# Patient Record
Sex: FEMALE | Race: WHITE | NOT HISPANIC OR LATINO | Employment: UNEMPLOYED | URBAN - METROPOLITAN AREA
[De-identification: names, ages, dates, MRNs, and addresses within clinical notes are randomized per-mention and may not be internally consistent; named-entity substitution may affect disease eponyms.]

---

## 2024-08-29 ENCOUNTER — APPOINTMENT (OUTPATIENT)
Dept: ADMISSIONS | Facility: MEDICAL CENTER | Age: 64
End: 2024-08-29
Attending: ORTHOPAEDIC SURGERY
Payer: MEDICAID

## 2024-08-29 NOTE — DISCHARGE PLANNING
DISCHARGE PLANNING NOTE - TOTAL JOINT    Procedure: Procedure(s):  SURGICAL FIXATION OF RIGHT TRIMALLEOLAR ANKLE FRACTURE, OTHER INDICATED PROCEDURES  Procedure Date: 8/30/2024  Insurance: Payor: [place] MEDICA[place] / Plan: [place] / Product Type: *No Product type* /    Equipment currently available at home?  shower chair and wheelchair  Steps into the home? 0  Steps within the home? Multi level home   Toilet height? Standard  Type of shower? walk-in shower  Who will be with you during your recovery? other: SO    Plan:   RN JAIME spoke with pt over the phone during scheduled CM appointment. Home safety check list and medical equipment list reviewed and emailed. Pt states she was told she was going to be NWB for 6-10 weeks. Recommended crutches, knee scooter and toilet seat riser. Discharge criteria discussed with patient. All questions and concerns addressed. Anticipate discharge home with no barriers.

## 2024-08-30 ENCOUNTER — HOSPITAL ENCOUNTER (OUTPATIENT)
Facility: MEDICAL CENTER | Age: 64
End: 2024-08-31
Attending: ORTHOPAEDIC SURGERY | Admitting: STUDENT IN AN ORGANIZED HEALTH CARE EDUCATION/TRAINING PROGRAM
Payer: MEDICAID

## 2024-08-30 ENCOUNTER — ANESTHESIA (OUTPATIENT)
Dept: SURGERY | Facility: MEDICAL CENTER | Age: 64
End: 2024-08-30
Payer: MEDICAID

## 2024-08-30 ENCOUNTER — APPOINTMENT (OUTPATIENT)
Dept: RADIOLOGY | Facility: MEDICAL CENTER | Age: 64
End: 2024-08-30
Attending: ORTHOPAEDIC SURGERY
Payer: MEDICAID

## 2024-08-30 ENCOUNTER — ANESTHESIA EVENT (OUTPATIENT)
Dept: SURGERY | Facility: MEDICAL CENTER | Age: 64
End: 2024-08-30
Payer: MEDICAID

## 2024-08-30 DIAGNOSIS — F41.9 ANXIETY: ICD-10-CM

## 2024-08-30 DIAGNOSIS — S82.891A CLOSED RIGHT ANKLE FRACTURE, INITIAL ENCOUNTER: ICD-10-CM

## 2024-08-30 PROBLEM — G89.29 CHRONIC PAIN: Status: ACTIVE | Noted: 2024-08-30

## 2024-08-30 PROBLEM — I16.1 HYPERTENSIVE EMERGENCY: Status: ACTIVE | Noted: 2024-08-30

## 2024-08-30 PROBLEM — F11.20 CHRONIC NARCOTIC DEPENDENCE (HCC): Status: ACTIVE | Noted: 2024-08-30

## 2024-08-30 LAB
ANION GAP SERPL CALC-SCNC: 14 MMOL/L (ref 7–16)
BUN SERPL-MCNC: 12 MG/DL (ref 8–22)
CALCIUM SERPL-MCNC: 9.5 MG/DL (ref 8.5–10.5)
CHLORIDE SERPL-SCNC: 104 MMOL/L (ref 96–112)
CO2 SERPL-SCNC: 22 MMOL/L (ref 20–33)
CREAT SERPL-MCNC: 0.97 MG/DL (ref 0.5–1.4)
EKG IMPRESSION: NORMAL
ERYTHROCYTE [DISTWIDTH] IN BLOOD BY AUTOMATED COUNT: 51.2 FL (ref 35.9–50)
GFR SERPLBLD CREATININE-BSD FMLA CKD-EPI: 66 ML/MIN/1.73 M 2
GLUCOSE SERPL-MCNC: 114 MG/DL (ref 65–99)
HCT VFR BLD AUTO: 38.9 % (ref 37–47)
HGB BLD-MCNC: 13 G/DL (ref 12–16)
MCH RBC QN AUTO: 34.9 PG (ref 27–33)
MCHC RBC AUTO-ENTMCNC: 33.4 G/DL (ref 32.2–35.5)
MCV RBC AUTO: 104.3 FL (ref 81.4–97.8)
PLATELET # BLD AUTO: 371 K/UL (ref 164–446)
PMV BLD AUTO: 10.6 FL (ref 9–12.9)
POTASSIUM SERPL-SCNC: 5.1 MMOL/L (ref 3.6–5.5)
RBC # BLD AUTO: 3.73 M/UL (ref 4.2–5.4)
SODIUM SERPL-SCNC: 140 MMOL/L (ref 135–145)
WBC # BLD AUTO: 7.5 K/UL (ref 4.8–10.8)

## 2024-08-30 PROCEDURE — 160002 HCHG RECOVERY MINUTES (STAT): Performed by: ORTHOPAEDIC SURGERY

## 2024-08-30 PROCEDURE — 700111 HCHG RX REV CODE 636 W/ 250 OVERRIDE (IP): Performed by: ANESTHESIOLOGY

## 2024-08-30 PROCEDURE — A9270 NON-COVERED ITEM OR SERVICE: HCPCS | Performed by: ANESTHESIOLOGY

## 2024-08-30 PROCEDURE — G0378 HOSPITAL OBSERVATION PER HR: HCPCS

## 2024-08-30 PROCEDURE — 93005 ELECTROCARDIOGRAM TRACING: CPT | Performed by: ORTHOPAEDIC SURGERY

## 2024-08-30 PROCEDURE — 96375 TX/PRO/DX INJ NEW DRUG ADDON: CPT

## 2024-08-30 PROCEDURE — C1713 ANCHOR/SCREW BN/BN,TIS/BN: HCPCS | Performed by: ORTHOPAEDIC SURGERY

## 2024-08-30 PROCEDURE — 99223 1ST HOSP IP/OBS HIGH 75: CPT | Performed by: STUDENT IN AN ORGANIZED HEALTH CARE EDUCATION/TRAINING PROGRAM

## 2024-08-30 PROCEDURE — 700105 HCHG RX REV CODE 258: Performed by: ANESTHESIOLOGY

## 2024-08-30 PROCEDURE — 700102 HCHG RX REV CODE 250 W/ 637 OVERRIDE(OP)

## 2024-08-30 PROCEDURE — 96374 THER/PROPH/DIAG INJ IV PUSH: CPT

## 2024-08-30 PROCEDURE — 700102 HCHG RX REV CODE 250 W/ 637 OVERRIDE(OP): Performed by: ANESTHESIOLOGY

## 2024-08-30 PROCEDURE — 160036 HCHG PACU - EA ADDL 30 MINS PHASE I: Performed by: ORTHOPAEDIC SURGERY

## 2024-08-30 PROCEDURE — 80048 BASIC METABOLIC PNL TOTAL CA: CPT

## 2024-08-30 PROCEDURE — 160009 HCHG ANES TIME/MIN: Performed by: ORTHOPAEDIC SURGERY

## 2024-08-30 PROCEDURE — 160048 HCHG OR STATISTICAL LEVEL 1-5: Performed by: ORTHOPAEDIC SURGERY

## 2024-08-30 PROCEDURE — 160029 HCHG SURGERY MINUTES - 1ST 30 MINS LEVEL 4: Performed by: ORTHOPAEDIC SURGERY

## 2024-08-30 PROCEDURE — 700111 HCHG RX REV CODE 636 W/ 250 OVERRIDE (IP): Mod: JZ | Performed by: ANESTHESIOLOGY

## 2024-08-30 PROCEDURE — 93010 ELECTROCARDIOGRAM REPORT: CPT | Performed by: INTERNAL MEDICINE

## 2024-08-30 PROCEDURE — 700102 HCHG RX REV CODE 250 W/ 637 OVERRIDE(OP): Performed by: STUDENT IN AN ORGANIZED HEALTH CARE EDUCATION/TRAINING PROGRAM

## 2024-08-30 PROCEDURE — 73610 X-RAY EXAM OF ANKLE: CPT | Mod: RT

## 2024-08-30 PROCEDURE — A9270 NON-COVERED ITEM OR SERVICE: HCPCS

## 2024-08-30 PROCEDURE — A9270 NON-COVERED ITEM OR SERVICE: HCPCS | Performed by: STUDENT IN AN ORGANIZED HEALTH CARE EDUCATION/TRAINING PROGRAM

## 2024-08-30 PROCEDURE — 64445 NJX AA&/STRD SCIATIC NRV IMG: CPT | Performed by: ORTHOPAEDIC SURGERY

## 2024-08-30 PROCEDURE — 64447 NJX AA&/STRD FEMORAL NRV IMG: CPT | Performed by: ORTHOPAEDIC SURGERY

## 2024-08-30 PROCEDURE — 160035 HCHG PACU - 1ST 60 MINS PHASE I: Performed by: ORTHOPAEDIC SURGERY

## 2024-08-30 PROCEDURE — 85027 COMPLETE CBC AUTOMATED: CPT

## 2024-08-30 PROCEDURE — 700101 HCHG RX REV CODE 250: Performed by: ANESTHESIOLOGY

## 2024-08-30 PROCEDURE — 700105 HCHG RX REV CODE 258: Performed by: ORTHOPAEDIC SURGERY

## 2024-08-30 PROCEDURE — 160041 HCHG SURGERY MINUTES - EA ADDL 1 MIN LEVEL 4: Performed by: ORTHOPAEDIC SURGERY

## 2024-08-30 PROCEDURE — 700111 HCHG RX REV CODE 636 W/ 250 OVERRIDE (IP): Performed by: STUDENT IN AN ORGANIZED HEALTH CARE EDUCATION/TRAINING PROGRAM

## 2024-08-30 DEVICE — IMPLANTABLE DEVICE: Type: IMPLANTABLE DEVICE | Site: ANKLE | Status: FUNCTIONAL

## 2024-08-30 DEVICE — SCREW BONE VARIAX T10 FULL THREAD L12 MM OD3.5 MM FOOT ANKLE LOCK STARDRIVE NONSTERILE: Type: IMPLANTABLE DEVICE | Site: ANKLE | Status: FUNCTIONAL

## 2024-08-30 DEVICE — SCREW BONE VARIAX T10 FULL THREAD L14 MM OD3.5 MM FOOT ANKLE LOCK STARDRIVE NONSTERILE: Type: IMPLANTABLE DEVICE | Site: ANKLE | Status: FUNCTIONAL

## 2024-08-30 RX ORDER — LIDOCAINE HYDROCHLORIDE 20 MG/ML
INJECTION, SOLUTION EPIDURAL; INFILTRATION; INTRACAUDAL; PERINEURAL PRN
Status: DISCONTINUED | OUTPATIENT
Start: 2024-08-30 | End: 2024-08-30 | Stop reason: SURG

## 2024-08-30 RX ORDER — BUPIVACAINE HYDROCHLORIDE 5 MG/ML
INJECTION, SOLUTION EPIDURAL; INTRACAUDAL
Status: COMPLETED | OUTPATIENT
Start: 2024-08-30 | End: 2024-08-30

## 2024-08-30 RX ORDER — AMOXICILLIN 250 MG
2 CAPSULE ORAL EVERY EVENING
Status: DISCONTINUED | OUTPATIENT
Start: 2024-08-30 | End: 2024-08-31 | Stop reason: HOSPADM

## 2024-08-30 RX ORDER — OXYCODONE HYDROCHLORIDE 10 MG/1
10 TABLET ORAL
Status: DISCONTINUED | OUTPATIENT
Start: 2024-08-30 | End: 2024-08-31 | Stop reason: HOSPADM

## 2024-08-30 RX ORDER — METOCLOPRAMIDE HYDROCHLORIDE 5 MG/ML
10 INJECTION INTRAMUSCULAR; INTRAVENOUS EVERY 6 HOURS
Status: DISCONTINUED | OUTPATIENT
Start: 2024-08-30 | End: 2024-08-31 | Stop reason: HOSPADM

## 2024-08-30 RX ORDER — MULTIVITAMIN WITH IRON
250 TABLET ORAL DAILY
Status: DISCONTINUED | OUTPATIENT
Start: 2024-08-31 | End: 2024-08-30

## 2024-08-30 RX ORDER — SCOLOPAMINE TRANSDERMAL SYSTEM 1 MG/1
PATCH, EXTENDED RELEASE TRANSDERMAL
Status: COMPLETED
Start: 2024-08-30 | End: 2024-08-30

## 2024-08-30 RX ORDER — SCOLOPAMINE TRANSDERMAL SYSTEM 1 MG/1
PATCH, EXTENDED RELEASE TRANSDERMAL PRN
Status: DISCONTINUED | OUTPATIENT
Start: 2024-08-30 | End: 2024-08-30 | Stop reason: SURG

## 2024-08-30 RX ORDER — HYDRALAZINE HYDROCHLORIDE 20 MG/ML
10 INJECTION INTRAMUSCULAR; INTRAVENOUS EVERY 4 HOURS PRN
Status: DISCONTINUED | OUTPATIENT
Start: 2024-08-30 | End: 2024-08-31 | Stop reason: HOSPADM

## 2024-08-30 RX ORDER — METOCLOPRAMIDE HYDROCHLORIDE 5 MG/ML
INJECTION INTRAMUSCULAR; INTRAVENOUS PRN
Status: DISCONTINUED | OUTPATIENT
Start: 2024-08-30 | End: 2024-08-30 | Stop reason: SURG

## 2024-08-30 RX ORDER — HYDROMORPHONE HYDROCHLORIDE 1 MG/ML
0.5 INJECTION, SOLUTION INTRAMUSCULAR; INTRAVENOUS; SUBCUTANEOUS
Status: DISCONTINUED | OUTPATIENT
Start: 2024-08-30 | End: 2024-08-31 | Stop reason: HOSPADM

## 2024-08-30 RX ORDER — SODIUM CHLORIDE, SODIUM LACTATE, POTASSIUM CHLORIDE, CALCIUM CHLORIDE 600; 310; 30; 20 MG/100ML; MG/100ML; MG/100ML; MG/100ML
INJECTION, SOLUTION INTRAVENOUS CONTINUOUS
Status: DISCONTINUED | OUTPATIENT
Start: 2024-08-30 | End: 2024-08-30

## 2024-08-30 RX ORDER — HYDROMORPHONE HYDROCHLORIDE 1 MG/ML
0.2 INJECTION, SOLUTION INTRAMUSCULAR; INTRAVENOUS; SUBCUTANEOUS
Status: DISCONTINUED | OUTPATIENT
Start: 2024-08-30 | End: 2024-08-30 | Stop reason: HOSPADM

## 2024-08-30 RX ORDER — PROMETHAZINE HYDROCHLORIDE 25 MG/1
12.5-25 TABLET ORAL EVERY 4 HOURS PRN
Status: DISCONTINUED | OUTPATIENT
Start: 2024-08-30 | End: 2024-08-31 | Stop reason: HOSPADM

## 2024-08-30 RX ORDER — LABETALOL HYDROCHLORIDE 5 MG/ML
5 INJECTION, SOLUTION INTRAVENOUS
Status: DISCONTINUED | OUTPATIENT
Start: 2024-08-30 | End: 2024-08-30 | Stop reason: HOSPADM

## 2024-08-30 RX ORDER — IPRATROPIUM BROMIDE AND ALBUTEROL SULFATE 2.5; .5 MG/3ML; MG/3ML
3 SOLUTION RESPIRATORY (INHALATION)
Status: DISCONTINUED | OUTPATIENT
Start: 2024-08-30 | End: 2024-08-31 | Stop reason: HOSPADM

## 2024-08-30 RX ORDER — KETOROLAC TROMETHAMINE 15 MG/ML
15 INJECTION, SOLUTION INTRAMUSCULAR; INTRAVENOUS EVERY 6 HOURS
Status: DISCONTINUED | OUTPATIENT
Start: 2024-08-30 | End: 2024-08-31 | Stop reason: HOSPADM

## 2024-08-30 RX ORDER — IBUPROFEN 800 MG/1
800 TABLET, FILM COATED ORAL 3 TIMES DAILY PRN
Status: DISCONTINUED | OUTPATIENT
Start: 2024-09-02 | End: 2024-08-31 | Stop reason: HOSPADM

## 2024-08-30 RX ORDER — DIPHENHYDRAMINE HYDROCHLORIDE 50 MG/ML
12.5 INJECTION INTRAMUSCULAR; INTRAVENOUS
Status: DISCONTINUED | OUTPATIENT
Start: 2024-08-30 | End: 2024-08-30 | Stop reason: HOSPADM

## 2024-08-30 RX ORDER — CEFAZOLIN SODIUM 1 G/3ML
INJECTION, POWDER, FOR SOLUTION INTRAMUSCULAR; INTRAVENOUS PRN
Status: DISCONTINUED | OUTPATIENT
Start: 2024-08-30 | End: 2024-08-30 | Stop reason: SURG

## 2024-08-30 RX ORDER — ACETAMINOPHEN 325 MG/1
650 TABLET ORAL EVERY 6 HOURS PRN
Status: DISCONTINUED | OUTPATIENT
Start: 2024-08-30 | End: 2024-08-30

## 2024-08-30 RX ORDER — EPHEDRINE SULFATE 50 MG/ML
5 INJECTION, SOLUTION INTRAVENOUS
Status: DISCONTINUED | OUTPATIENT
Start: 2024-08-30 | End: 2024-08-30 | Stop reason: HOSPADM

## 2024-08-30 RX ORDER — SODIUM CHLORIDE, SODIUM LACTATE, POTASSIUM CHLORIDE, CALCIUM CHLORIDE 600; 310; 30; 20 MG/100ML; MG/100ML; MG/100ML; MG/100ML
INJECTION, SOLUTION INTRAVENOUS CONTINUOUS
Status: DISCONTINUED | OUTPATIENT
Start: 2024-08-30 | End: 2024-08-30 | Stop reason: HOSPADM

## 2024-08-30 RX ORDER — ACETAMINOPHEN 325 MG/1
650 TABLET ORAL EVERY 6 HOURS
Status: DISCONTINUED | OUTPATIENT
Start: 2024-08-30 | End: 2024-08-31 | Stop reason: HOSPADM

## 2024-08-30 RX ORDER — SODIUM CHLORIDE, SODIUM LACTATE, POTASSIUM CHLORIDE, CALCIUM CHLORIDE 600; 310; 30; 20 MG/100ML; MG/100ML; MG/100ML; MG/100ML
INJECTION, SOLUTION INTRAVENOUS CONTINUOUS
Status: ACTIVE | OUTPATIENT
Start: 2024-08-30 | End: 2024-08-31

## 2024-08-30 RX ORDER — PROCHLORPERAZINE EDISYLATE 5 MG/ML
5-10 INJECTION INTRAMUSCULAR; INTRAVENOUS EVERY 4 HOURS PRN
Status: DISCONTINUED | OUTPATIENT
Start: 2024-08-30 | End: 2024-08-31 | Stop reason: HOSPADM

## 2024-08-30 RX ORDER — OXYCODONE HYDROCHLORIDE 5 MG/1
5 TABLET ORAL
Status: DISCONTINUED | OUTPATIENT
Start: 2024-08-30 | End: 2024-08-31 | Stop reason: HOSPADM

## 2024-08-30 RX ORDER — DEXAMETHASONE SODIUM PHOSPHATE 4 MG/ML
INJECTION, SOLUTION INTRA-ARTICULAR; INTRALESIONAL; INTRAMUSCULAR; INTRAVENOUS; SOFT TISSUE
Status: COMPLETED | OUTPATIENT
Start: 2024-08-30 | End: 2024-08-30

## 2024-08-30 RX ORDER — PROMETHAZINE HYDROCHLORIDE 25 MG/1
12.5-25 SUPPOSITORY RECTAL EVERY 4 HOURS PRN
Status: DISCONTINUED | OUTPATIENT
Start: 2024-08-30 | End: 2024-08-31 | Stop reason: HOSPADM

## 2024-08-30 RX ORDER — DEXAMETHASONE SODIUM PHOSPHATE 4 MG/ML
INJECTION, SOLUTION INTRA-ARTICULAR; INTRALESIONAL; INTRAMUSCULAR; INTRAVENOUS; SOFT TISSUE PRN
Status: DISCONTINUED | OUTPATIENT
Start: 2024-08-30 | End: 2024-08-30 | Stop reason: SURG

## 2024-08-30 RX ORDER — ONDANSETRON 2 MG/ML
4 INJECTION INTRAMUSCULAR; INTRAVENOUS EVERY 4 HOURS PRN
Status: DISCONTINUED | OUTPATIENT
Start: 2024-08-30 | End: 2024-08-31 | Stop reason: HOSPADM

## 2024-08-30 RX ORDER — SODIUM CHLORIDE, SODIUM LACTATE, POTASSIUM CHLORIDE, CALCIUM CHLORIDE 600; 310; 30; 20 MG/100ML; MG/100ML; MG/100ML; MG/100ML
INJECTION, SOLUTION INTRAVENOUS
Status: DISCONTINUED | OUTPATIENT
Start: 2024-08-30 | End: 2024-08-30 | Stop reason: SURG

## 2024-08-30 RX ORDER — HYDRALAZINE HYDROCHLORIDE 20 MG/ML
5 INJECTION INTRAMUSCULAR; INTRAVENOUS
Status: DISCONTINUED | OUTPATIENT
Start: 2024-08-30 | End: 2024-08-30 | Stop reason: HOSPADM

## 2024-08-30 RX ORDER — ONDANSETRON 2 MG/ML
INJECTION INTRAMUSCULAR; INTRAVENOUS PRN
Status: DISCONTINUED | OUTPATIENT
Start: 2024-08-30 | End: 2024-08-30 | Stop reason: SURG

## 2024-08-30 RX ORDER — SODIUM CHLORIDE, SODIUM LACTATE, POTASSIUM CHLORIDE, AND CALCIUM CHLORIDE .6; .31; .03; .02 G/100ML; G/100ML; G/100ML; G/100ML
500 INJECTION, SOLUTION INTRAVENOUS
Status: DISCONTINUED | OUTPATIENT
Start: 2024-08-30 | End: 2024-08-31 | Stop reason: HOSPADM

## 2024-08-30 RX ORDER — SODIUM CHLORIDE, SODIUM LACTATE, POTASSIUM CHLORIDE, CALCIUM CHLORIDE 600; 310; 30; 20 MG/100ML; MG/100ML; MG/100ML; MG/100ML
INJECTION, SOLUTION INTRAVENOUS CONTINUOUS
Status: ACTIVE | OUTPATIENT
Start: 2024-08-30 | End: 2024-08-30

## 2024-08-30 RX ORDER — LABETALOL HYDROCHLORIDE 5 MG/ML
10 INJECTION, SOLUTION INTRAVENOUS EVERY 4 HOURS PRN
Status: DISCONTINUED | OUTPATIENT
Start: 2024-08-30 | End: 2024-08-31 | Stop reason: HOSPADM

## 2024-08-30 RX ORDER — POLYETHYLENE GLYCOL 3350 17 G/17G
1 POWDER, FOR SOLUTION ORAL
Status: DISCONTINUED | OUTPATIENT
Start: 2024-08-30 | End: 2024-08-31 | Stop reason: HOSPADM

## 2024-08-30 RX ORDER — MAGNESIUM SULFATE HEPTAHYDRATE 40 MG/ML
INJECTION, SOLUTION INTRAVENOUS PRN
Status: DISCONTINUED | OUTPATIENT
Start: 2024-08-30 | End: 2024-08-30 | Stop reason: SURG

## 2024-08-30 RX ORDER — KETOROLAC TROMETHAMINE 15 MG/ML
INJECTION, SOLUTION INTRAMUSCULAR; INTRAVENOUS PRN
Status: DISCONTINUED | OUTPATIENT
Start: 2024-08-30 | End: 2024-08-30 | Stop reason: SURG

## 2024-08-30 RX ORDER — HYDROMORPHONE HYDROCHLORIDE 1 MG/ML
0.4 INJECTION, SOLUTION INTRAMUSCULAR; INTRAVENOUS; SUBCUTANEOUS
Status: DISCONTINUED | OUTPATIENT
Start: 2024-08-30 | End: 2024-08-30 | Stop reason: HOSPADM

## 2024-08-30 RX ORDER — DIAZEPAM 5 MG/1
5 TABLET ORAL 3 TIMES DAILY
Status: DISCONTINUED | OUTPATIENT
Start: 2024-08-30 | End: 2024-08-31 | Stop reason: HOSPADM

## 2024-08-30 RX ORDER — ACETAMINOPHEN 325 MG/1
650 TABLET ORAL EVERY 6 HOURS PRN
Status: DISCONTINUED | OUTPATIENT
Start: 2024-09-04 | End: 2024-08-31 | Stop reason: HOSPADM

## 2024-08-30 RX ORDER — MIDAZOLAM HYDROCHLORIDE 1 MG/ML
INJECTION INTRAMUSCULAR; INTRAVENOUS PRN
Status: DISCONTINUED | OUTPATIENT
Start: 2024-08-30 | End: 2024-08-30 | Stop reason: SURG

## 2024-08-30 RX ORDER — HYDROMORPHONE HYDROCHLORIDE 1 MG/ML
0.1 INJECTION, SOLUTION INTRAMUSCULAR; INTRAVENOUS; SUBCUTANEOUS
Status: DISCONTINUED | OUTPATIENT
Start: 2024-08-30 | End: 2024-08-30 | Stop reason: HOSPADM

## 2024-08-30 RX ORDER — IBUPROFEN 200 MG
800 TABLET ORAL EVERY 8 HOURS PRN
COMMUNITY

## 2024-08-30 RX ORDER — MIDAZOLAM HYDROCHLORIDE 1 MG/ML
2 INJECTION INTRAMUSCULAR; INTRAVENOUS
Status: DISCONTINUED | OUTPATIENT
Start: 2024-08-30 | End: 2024-08-30 | Stop reason: HOSPADM

## 2024-08-30 RX ORDER — PANTOPRAZOLE SODIUM 40 MG/10ML
40 INJECTION, POWDER, LYOPHILIZED, FOR SOLUTION INTRAVENOUS 2 TIMES DAILY
Status: DISCONTINUED | OUTPATIENT
Start: 2024-08-30 | End: 2024-08-31 | Stop reason: HOSPADM

## 2024-08-30 RX ORDER — OXYCODONE HYDROCHLORIDE 10 MG/1
10 TABLET ORAL ONCE
Status: COMPLETED | OUTPATIENT
Start: 2024-08-30 | End: 2024-08-30

## 2024-08-30 RX ORDER — DEXMEDETOMIDINE HYDROCHLORIDE 100 UG/ML
INJECTION, SOLUTION INTRAVENOUS PRN
Status: DISCONTINUED | OUTPATIENT
Start: 2024-08-30 | End: 2024-08-30 | Stop reason: SURG

## 2024-08-30 RX ORDER — HALOPERIDOL 5 MG/ML
1 INJECTION INTRAMUSCULAR
Status: DISCONTINUED | OUTPATIENT
Start: 2024-08-30 | End: 2024-08-30 | Stop reason: HOSPADM

## 2024-08-30 RX ORDER — ONDANSETRON 4 MG/1
4 TABLET, ORALLY DISINTEGRATING ORAL EVERY 4 HOURS PRN
Status: DISCONTINUED | OUTPATIENT
Start: 2024-08-30 | End: 2024-08-31 | Stop reason: HOSPADM

## 2024-08-30 RX ORDER — ALBUTEROL SULFATE 5 MG/ML
2.5 SOLUTION RESPIRATORY (INHALATION)
Status: DISCONTINUED | OUTPATIENT
Start: 2024-08-30 | End: 2024-08-30 | Stop reason: HOSPADM

## 2024-08-30 RX ORDER — OXYCODONE HYDROCHLORIDE 5 MG/1
10 TABLET ORAL EVERY 6 HOURS
Status: DISCONTINUED | OUTPATIENT
Start: 2024-08-30 | End: 2024-08-31 | Stop reason: HOSPADM

## 2024-08-30 RX ORDER — LOSARTAN POTASSIUM 50 MG/1
50 TABLET ORAL
Status: DISCONTINUED | OUTPATIENT
Start: 2024-08-30 | End: 2024-08-31 | Stop reason: HOSPADM

## 2024-08-30 RX ORDER — ZALEPLON 10 MG/1
10 CAPSULE ORAL NIGHTLY
Status: DISCONTINUED | OUTPATIENT
Start: 2024-08-30 | End: 2024-08-30

## 2024-08-30 RX ORDER — ACETAMINOPHEN 500 MG
TABLET ORAL
Status: COMPLETED
Start: 2024-08-30 | End: 2024-08-30

## 2024-08-30 RX ADMIN — SODIUM CHLORIDE, POTASSIUM CHLORIDE, SODIUM LACTATE AND CALCIUM CHLORIDE: 600; 310; 30; 20 INJECTION, SOLUTION INTRAVENOUS at 16:09

## 2024-08-30 RX ADMIN — HYDRALAZINE HYDROCHLORIDE 5 MG: 20 INJECTION, SOLUTION INTRAMUSCULAR; INTRAVENOUS at 19:29

## 2024-08-30 RX ADMIN — DEXMEDETOMIDINE HYDROCHLORIDE 4 MCG: 100 INJECTION, SOLUTION INTRAVENOUS at 16:45

## 2024-08-30 RX ADMIN — ACETAMINOPHEN 1000 MG: 500 TABLET ORAL at 15:16

## 2024-08-30 RX ADMIN — DEXAMETHASONE SODIUM PHOSPHATE 2 MG: 4 INJECTION INTRA-ARTICULAR; INTRALESIONAL; INTRAMUSCULAR; INTRAVENOUS; SOFT TISSUE at 16:17

## 2024-08-30 RX ADMIN — HYDROMORPHONE HYDROCHLORIDE 0.4 MG: 1 INJECTION, SOLUTION INTRAMUSCULAR; INTRAVENOUS; SUBCUTANEOUS at 20:29

## 2024-08-30 RX ADMIN — OXYCODONE HYDROCHLORIDE 10 MG: 5 TABLET ORAL at 20:29

## 2024-08-30 RX ADMIN — HYDRALAZINE HYDROCHLORIDE 5 MG: 20 INJECTION, SOLUTION INTRAMUSCULAR; INTRAVENOUS at 19:21

## 2024-08-30 RX ADMIN — SODIUM CHLORIDE, POTASSIUM CHLORIDE, SODIUM LACTATE AND CALCIUM CHLORIDE: 600; 310; 30; 20 INJECTION, SOLUTION INTRAVENOUS at 17:37

## 2024-08-30 RX ADMIN — MIDAZOLAM HYDROCHLORIDE 0.5 MG: 1 INJECTION, SOLUTION INTRAMUSCULAR; INTRAVENOUS at 18:32

## 2024-08-30 RX ADMIN — DEXAMETHASONE SODIUM PHOSPHATE 8 MG: 4 INJECTION, SOLUTION INTRA-ARTICULAR; INTRALESIONAL; INTRAMUSCULAR; INTRAVENOUS; SOFT TISSUE at 16:30

## 2024-08-30 RX ADMIN — CEFAZOLIN 2 G: 1 INJECTION, POWDER, FOR SOLUTION INTRAMUSCULAR; INTRAVENOUS at 16:30

## 2024-08-30 RX ADMIN — LIDOCAINE HYDROCHLORIDE 100 MG: 20 INJECTION, SOLUTION EPIDURAL; INFILTRATION; INTRACAUDAL at 17:35

## 2024-08-30 RX ADMIN — ONDANSETRON 8 MG: 2 INJECTION INTRAMUSCULAR; INTRAVENOUS at 17:35

## 2024-08-30 RX ADMIN — BUPIVACAINE HYDROCHLORIDE 16 ML: 5 INJECTION, SOLUTION EPIDURAL; INTRACAUDAL at 16:17

## 2024-08-30 RX ADMIN — HYDRALAZINE HYDROCHLORIDE 5 MG: 20 INJECTION, SOLUTION INTRAMUSCULAR; INTRAVENOUS at 20:18

## 2024-08-30 RX ADMIN — FENTANYL CITRATE 50 MCG: 50 INJECTION, SOLUTION INTRAMUSCULAR; INTRAVENOUS at 17:35

## 2024-08-30 RX ADMIN — DEXAMETHASONE SODIUM PHOSPHATE 1.5 MG: 4 INJECTION, SOLUTION INTRA-ARTICULAR; INTRALESIONAL; INTRAMUSCULAR; INTRAVENOUS; SOFT TISSUE at 16:21

## 2024-08-30 RX ADMIN — HYDROMORPHONE HYDROCHLORIDE 0.4 MG: 1 INJECTION, SOLUTION INTRAMUSCULAR; INTRAVENOUS; SUBCUTANEOUS at 20:56

## 2024-08-30 RX ADMIN — MAGNESIUM SULFATE HEPTAHYDRATE 4 G: 2 INJECTION, SOLUTION INTRAVENOUS at 16:30

## 2024-08-30 RX ADMIN — TIZANIDINE 4 MG: 4 TABLET ORAL at 23:50

## 2024-08-30 RX ADMIN — SENNOSIDES AND DOCUSATE SODIUM 2 TABLET: 50; 8.6 TABLET ORAL at 23:51

## 2024-08-30 RX ADMIN — HYDROMORPHONE HYDROCHLORIDE 0.2 MG: 1 INJECTION, SOLUTION INTRAMUSCULAR; INTRAVENOUS; SUBCUTANEOUS at 18:31

## 2024-08-30 RX ADMIN — KETOROLAC TROMETHAMINE 15 MG: 15 INJECTION, SOLUTION INTRAMUSCULAR; INTRAVENOUS at 17:35

## 2024-08-30 RX ADMIN — HYDROMORPHONE HYDROCHLORIDE 0.2 MG: 1 INJECTION, SOLUTION INTRAMUSCULAR; INTRAVENOUS; SUBCUTANEOUS at 21:09

## 2024-08-30 RX ADMIN — HYDROMORPHONE HYDROCHLORIDE 0.4 MG: 1 INJECTION, SOLUTION INTRAMUSCULAR; INTRAVENOUS; SUBCUTANEOUS at 18:15

## 2024-08-30 RX ADMIN — PANTOPRAZOLE SODIUM 40 MG: 40 INJECTION, POWDER, FOR SOLUTION INTRAVENOUS at 23:54

## 2024-08-30 RX ADMIN — OXYCODONE HYDROCHLORIDE 10 MG: 10 TABLET ORAL at 15:16

## 2024-08-30 RX ADMIN — METOCLOPRAMIDE 10 MG: 5 INJECTION, SOLUTION INTRAMUSCULAR; INTRAVENOUS at 23:56

## 2024-08-30 RX ADMIN — METOCLOPRAMIDE 10 MG: 5 INJECTION, SOLUTION INTRAMUSCULAR; INTRAVENOUS at 16:30

## 2024-08-30 RX ADMIN — SODIUM CHLORIDE, POTASSIUM CHLORIDE, SODIUM LACTATE AND CALCIUM CHLORIDE: 600; 310; 30; 20 INJECTION, SOLUTION INTRAVENOUS at 18:32

## 2024-08-30 RX ADMIN — HYDRALAZINE HYDROCHLORIDE 5 MG: 20 INJECTION, SOLUTION INTRAMUSCULAR; INTRAVENOUS at 18:48

## 2024-08-30 RX ADMIN — ACETAMINOPHEN 650 MG: 325 TABLET ORAL at 23:52

## 2024-08-30 RX ADMIN — HYDROMORPHONE HYDROCHLORIDE 0.4 MG: 1 INJECTION, SOLUTION INTRAMUSCULAR; INTRAVENOUS; SUBCUTANEOUS at 18:26

## 2024-08-30 RX ADMIN — SODIUM CHLORIDE, POTASSIUM CHLORIDE, SODIUM LACTATE AND CALCIUM CHLORIDE: 600; 310; 30; 20 INJECTION, SOLUTION INTRAVENOUS at 14:33

## 2024-08-30 RX ADMIN — DIAZEPAM 5 MG: 5 TABLET ORAL at 23:52

## 2024-08-30 RX ADMIN — OXYCODONE HYDROCHLORIDE 10 MG: 5 TABLET ORAL at 23:49

## 2024-08-30 RX ADMIN — HALOPERIDOL LACTATE 1 MG: 5 INJECTION, SOLUTION INTRAMUSCULAR at 19:18

## 2024-08-30 RX ADMIN — MIDAZOLAM HYDROCHLORIDE 2 MG: 2 INJECTION, SOLUTION INTRAMUSCULAR; INTRAVENOUS at 16:11

## 2024-08-30 RX ADMIN — BUPIVACAINE HYDROCHLORIDE 9 ML: 5 INJECTION, SOLUTION EPIDURAL; INTRACAUDAL at 16:17

## 2024-08-30 RX ADMIN — DEXMEDETOMIDINE HYDROCHLORIDE 16 MCG: 100 INJECTION, SOLUTION INTRAVENOUS at 17:14

## 2024-08-30 RX ADMIN — SCOPOLAMINE 1 PATCH: 1.5 PATCH, EXTENDED RELEASE TRANSDERMAL at 16:35

## 2024-08-30 RX ADMIN — Medication 50 MG: at 16:30

## 2024-08-30 RX ADMIN — PROPOFOL 200 MG: 10 INJECTION, EMULSION INTRAVENOUS at 16:24

## 2024-08-30 SDOH — ECONOMIC STABILITY: TRANSPORTATION INSECURITY
IN THE PAST 12 MONTHS, HAS THE LACK OF TRANSPORTATION KEPT YOU FROM MEDICAL APPOINTMENTS OR FROM GETTING MEDICATIONS?: NO

## 2024-08-30 SDOH — ECONOMIC STABILITY: TRANSPORTATION INSECURITY
IN THE PAST 12 MONTHS, HAS LACK OF RELIABLE TRANSPORTATION KEPT YOU FROM MEDICAL APPOINTMENTS, MEETINGS, WORK OR FROM GETTING THINGS NEEDED FOR DAILY LIVING?: NO

## 2024-08-30 ASSESSMENT — ENCOUNTER SYMPTOMS
FEVER: 0
NAUSEA: 1
PALPITATIONS: 0
VOMITING: 1
DEPRESSION: 0
DIZZINESS: 0
WEAKNESS: 1
SHORTNESS OF BREATH: 0
CHILLS: 0
BRUISES/BLEEDS EASILY: 0
BLURRED VISION: 0
HEADACHES: 1
ABDOMINAL PAIN: 0
HEARTBURN: 1
DOUBLE VISION: 0
COUGH: 0
MYALGIAS: 1
FALLS: 0

## 2024-08-30 ASSESSMENT — PAIN DESCRIPTION - PAIN TYPE
TYPE: CHRONIC PAIN

## 2024-08-30 ASSESSMENT — PATIENT HEALTH QUESTIONNAIRE - PHQ9
1. LITTLE INTEREST OR PLEASURE IN DOING THINGS: NOT AT ALL
2. FEELING DOWN, DEPRESSED, IRRITABLE, OR HOPELESS: NOT AT ALL
SUM OF ALL RESPONSES TO PHQ9 QUESTIONS 1 AND 2: 0

## 2024-08-30 ASSESSMENT — LIFESTYLE VARIABLES
HAVE PEOPLE ANNOYED YOU BY CRITICIZING YOUR DRINKING: NO
TOTAL SCORE: 1
HOW MANY TIMES IN THE PAST YEAR HAVE YOU HAD 5 OR MORE DRINKS IN A DAY: 2
ON A TYPICAL DAY WHEN YOU DRINK ALCOHOL HOW MANY DRINKS DO YOU HAVE: 2
EVER FELT BAD OR GUILTY ABOUT YOUR DRINKING: NO
TOTAL SCORE: 1
AVERAGE NUMBER OF DAYS PER WEEK YOU HAVE A DRINK CONTAINING ALCOHOL: 5
EVER HAD A DRINK FIRST THING IN THE MORNING TO STEADY YOUR NERVES TO GET RID OF A HANGOVER: NO
CONSUMPTION TOTAL: POSITIVE
ALCOHOL_USE: YES
TOTAL SCORE: 1
DOES PATIENT WANT TO STOP DRINKING: NO
HAVE YOU EVER FELT YOU SHOULD CUT DOWN ON YOUR DRINKING: YES
SUBSTANCE_ABUSE: 0

## 2024-08-30 ASSESSMENT — COGNITIVE AND FUNCTIONAL STATUS - GENERAL
MOVING TO AND FROM BED TO CHAIR: A LITTLE
WALKING IN HOSPITAL ROOM: A LITTLE
HELP NEEDED FOR BATHING: A LITTLE
SUGGESTED CMS G CODE MODIFIER DAILY ACTIVITY: CJ
CLIMB 3 TO 5 STEPS WITH RAILING: A LOT
STANDING UP FROM CHAIR USING ARMS: A LITTLE
TOILETING: A LITTLE
SUGGESTED CMS G CODE MODIFIER MOBILITY: CK
DRESSING REGULAR LOWER BODY CLOTHING: A LITTLE
MOBILITY SCORE: 19
DRESSING REGULAR UPPER BODY CLOTHING: A LITTLE
DAILY ACTIVITIY SCORE: 20

## 2024-08-30 ASSESSMENT — SOCIAL DETERMINANTS OF HEALTH (SDOH)

## 2024-08-30 ASSESSMENT — PAIN SCALES - GENERAL: PAIN_LEVEL: 6

## 2024-08-30 NOTE — ANESTHESIA PREPROCEDURE EVALUATION
Case: [id number] Date/Time: 08/30/24 1515    Procedure: SURGICAL FIXATION OF RIGHT TRIMALLEOLAR ANKLE FRACTURE, OTHER INDICATED PROCEDURES    Pre-op diagnosis: CLOSED TRIMALLEOLAR FRACTURE OF RIGHT ANKLE    Location: [facility] OR 08 / SURGERY [facility]    Surgeons: Vasiliy Hernandez M.D.          64yo F with trimalleolar fracture and here for ORIF    Relevant Problems   ANESTHESIA   (positive) PONV (postoperative nausea and vomiting)      Other   (positive) Anxiety (Takes valium at night)   (positive) Chronic narcotic dependence (HCC) (Oxy 40mg/day)   (positive) Chronic pain (Neck and back; has pain doctor Jil)       Physical Exam    Airway   Mallampati: II  TM distance: >3 FB  Neck ROM: full       Cardiovascular - normal exam  Rhythm: regular  Rate: normal  (-) murmur     Dental - normal exam           Pulmonary - normal exam  Breath sounds clear to auscultation     Abdominal    Neurological - normal exam                   Anesthesia Plan    ASA 3   ASA physical status 3 criteria: alcohol and/or substance dependence or abuse    Plan - general and peripheral nerve block     Peripheral nerve block will be post-op pain control  Airway plan will be LMA          Induction: intravenous    Postoperative Plan: Postoperative administration of opioids is intended.    Pertinent diagnostic labs and testing reviewed    Informed Consent:    Anesthetic plan and risks discussed with patient.    Use of blood products discussed with: patient whom consented to blood products.

## 2024-08-30 NOTE — PROGRESS NOTES
Medication history reviewed with PT at bedside    [facility] is complete per PT reporting    Allergies reviewed.     Patient denies any outpatient antibiotics in the last 30 days.     Patient is not taking anticoagulants.    PT receives trigger point injections every 2 months from Dr Ley at [facility]. Last injection was July 2024.    Preferred pharmacy for this visit - [facility] on [address] ([phone])

## 2024-08-31 ENCOUNTER — PHARMACY VISIT (OUTPATIENT)
Dept: PHARMACY | Facility: MEDICAL CENTER | Age: 64
End: 2024-08-31
Payer: COMMERCIAL

## 2024-08-31 VITALS
BODY MASS INDEX: 24.13 KG/M2 | SYSTOLIC BLOOD PRESSURE: 117 MMHG | DIASTOLIC BLOOD PRESSURE: 67 MMHG | HEIGHT: 65 IN | TEMPERATURE: 97.9 F | OXYGEN SATURATION: 94 % | WEIGHT: 144.84 LBS | HEART RATE: 64 BPM | RESPIRATION RATE: 16 BRPM

## 2024-08-31 PROCEDURE — RXMED WILLOW AMBULATORY MEDICATION CHARGE: Performed by: STUDENT IN AN ORGANIZED HEALTH CARE EDUCATION/TRAINING PROGRAM

## 2024-08-31 PROCEDURE — 97162 PT EVAL MOD COMPLEX 30 MIN: CPT

## 2024-08-31 PROCEDURE — 96376 TX/PRO/DX INJ SAME DRUG ADON: CPT

## 2024-08-31 PROCEDURE — 700105 HCHG RX REV CODE 258: Performed by: STUDENT IN AN ORGANIZED HEALTH CARE EDUCATION/TRAINING PROGRAM

## 2024-08-31 PROCEDURE — 700102 HCHG RX REV CODE 250 W/ 637 OVERRIDE(OP): Performed by: STUDENT IN AN ORGANIZED HEALTH CARE EDUCATION/TRAINING PROGRAM

## 2024-08-31 PROCEDURE — 700111 HCHG RX REV CODE 636 W/ 250 OVERRIDE (IP): Mod: JZ | Performed by: STUDENT IN AN ORGANIZED HEALTH CARE EDUCATION/TRAINING PROGRAM

## 2024-08-31 PROCEDURE — G0378 HOSPITAL OBSERVATION PER HR: HCPCS

## 2024-08-31 PROCEDURE — 96375 TX/PRO/DX INJ NEW DRUG ADDON: CPT

## 2024-08-31 PROCEDURE — 99239 HOSP IP/OBS DSCHRG MGMT >30: CPT | Performed by: STUDENT IN AN ORGANIZED HEALTH CARE EDUCATION/TRAINING PROGRAM

## 2024-08-31 PROCEDURE — A9270 NON-COVERED ITEM OR SERVICE: HCPCS | Performed by: STUDENT IN AN ORGANIZED HEALTH CARE EDUCATION/TRAINING PROGRAM

## 2024-08-31 PROCEDURE — 97535 SELF CARE MNGMENT TRAINING: CPT

## 2024-08-31 RX ORDER — DIAZEPAM 5 MG/1
5 TABLET ORAL 3 TIMES DAILY
Qty: 15 TABLET | Refills: 0 | Status: SHIPPED | OUTPATIENT
Start: 2024-08-31 | End: 2024-09-05

## 2024-08-31 RX ORDER — OXYCODONE AND ACETAMINOPHEN 10; 325 MG/1; MG/1
1 TABLET ORAL EVERY 6 HOURS PRN
Qty: 20 TABLET | Refills: 0 | Status: SHIPPED | OUTPATIENT
Start: 2024-08-31 | End: 2024-09-05

## 2024-08-31 RX ORDER — ONDANSETRON 4 MG/1
4 TABLET, ORALLY DISINTEGRATING ORAL EVERY 6 HOURS PRN
Qty: 10 TABLET | Refills: 0 | Status: SHIPPED | OUTPATIENT
Start: 2024-08-31

## 2024-08-31 RX ADMIN — HYDROMORPHONE HYDROCHLORIDE 0.5 MG: 1 INJECTION, SOLUTION INTRAMUSCULAR; INTRAVENOUS; SUBCUTANEOUS at 01:05

## 2024-08-31 RX ADMIN — KETOROLAC TROMETHAMINE 15 MG: 15 INJECTION, SOLUTION INTRAMUSCULAR; INTRAVENOUS at 11:54

## 2024-08-31 RX ADMIN — KETOROLAC TROMETHAMINE 15 MG: 15 INJECTION, SOLUTION INTRAMUSCULAR; INTRAVENOUS at 06:20

## 2024-08-31 RX ADMIN — DIAZEPAM 5 MG: 5 TABLET ORAL at 11:53

## 2024-08-31 RX ADMIN — PANTOPRAZOLE SODIUM 40 MG: 40 INJECTION, POWDER, FOR SOLUTION INTRAVENOUS at 06:12

## 2024-08-31 RX ADMIN — KETOROLAC TROMETHAMINE 15 MG: 15 INJECTION, SOLUTION INTRAMUSCULAR; INTRAVENOUS at 00:05

## 2024-08-31 RX ADMIN — ACETAMINOPHEN 650 MG: 325 TABLET ORAL at 11:53

## 2024-08-31 RX ADMIN — METOCLOPRAMIDE 10 MG: 5 INJECTION, SOLUTION INTRAMUSCULAR; INTRAVENOUS at 06:16

## 2024-08-31 RX ADMIN — SODIUM CHLORIDE, POTASSIUM CHLORIDE, SODIUM LACTATE AND CALCIUM CHLORIDE: 600; 310; 30; 20 INJECTION, SOLUTION INTRAVENOUS at 00:15

## 2024-08-31 RX ADMIN — OXYCODONE HYDROCHLORIDE 10 MG: 5 TABLET ORAL at 10:45

## 2024-08-31 RX ADMIN — DIAZEPAM 5 MG: 5 TABLET ORAL at 06:11

## 2024-08-31 RX ADMIN — OXYCODONE HYDROCHLORIDE 10 MG: 5 TABLET ORAL at 06:10

## 2024-08-31 RX ADMIN — LOSARTAN POTASSIUM 50 MG: 50 TABLET, FILM COATED ORAL at 00:10

## 2024-08-31 RX ADMIN — METOCLOPRAMIDE 10 MG: 5 INJECTION, SOLUTION INTRAMUSCULAR; INTRAVENOUS at 11:53

## 2024-08-31 RX ADMIN — ACETAMINOPHEN 650 MG: 325 TABLET ORAL at 06:08

## 2024-08-31 ASSESSMENT — COGNITIVE AND FUNCTIONAL STATUS - GENERAL
STANDING UP FROM CHAIR USING ARMS: A LITTLE
MOBILITY SCORE: 20
CLIMB 3 TO 5 STEPS WITH RAILING: A LITTLE
SUGGESTED CMS G CODE MODIFIER MOBILITY: CJ
MOVING TO AND FROM BED TO CHAIR: A LITTLE
WALKING IN HOSPITAL ROOM: A LITTLE

## 2024-08-31 ASSESSMENT — PAIN DESCRIPTION - PAIN TYPE: TYPE: CHRONIC PAIN

## 2024-08-31 NOTE — OP REPORT
DATE OF SERVICE:  08/30/2024     PREOPERATIVE DIAGNOSIS:  Right trimalleolar ankle fracture.     POSTOPERATIVE DIAGNOSIS:  Right trimalleolar ankle fracture.     PROCEDURE PERFORMED:  Open treatment with internal fixation of right   trimalleolar ankle fracture with fixation of posterior lip.     SURGEON:  Vasiliy Hernandez MD     ANESTHESIOLOGIST:  Nicky Harmon MD     ANESTHESIA:  General and regional.     ESTIMATED BLOOD LOSS:  Minimal.     TOURNIQUET TIME:  50 minutes at 250 mmHg to the right thigh.     IMPLANTS:  Raquel VariAx 1/3 tubular locking plate and 3.5 mm cortical   screws.     INDICATIONS FOR PROCEDURE:  The patient is 63 years old.  She is about a week   and a half out from a right trimalleolar ankle fracture sustained in [place].    She came back home.  I saw her this week in clinic and we discussed treatment   options.  I recommended surgical reduction and fixation for unstable   fracture.  She signed informed consent preoperatively and wished to proceed   with surgery as outlined above.     DESCRIPTION OF PROCEDURE:  The patient was met in the preoperative holding   area and her surgical site was signed.  Her consent was confirmed to be   accurate.  Dr. Harmon performed a regional anesthetic at my request to   help with postoperative pain control.  General anesthesia was induced after   she was taken back to the operating room.  Right thigh had a tourniquet   applied and right lower extremity was provisionally cleansed with isopropyl   alcohol and then prepped and draped in the usual sterile fashion.  A formal   timeout was performed to confirm patient's correct name, correct surgical   site, correct procedure and correct laterality.  The limb was exsanguinated   with an Esmarch and tourniquet was inflated to 250 mmHg.  A lateral incision   over the fibula fracture was performed with a scalpel down through skin.    Dissection was carried down to the fracture site.  There was a  comminution   present but I was able to position a laterally based 1/3 tubular locking   plate, bridging the main fracture fragments.  There was some far posterior   comminution present extending proximally but I was able to fix the plate to   bone distally with several unicortical locking screws bringing the fibula out   to length as much as possible and fixed it proximally with several locking   screws as well.  I then turned my attention to the medial malleolus and a   longitudinal incision was made over the medial malleolus with a scalpel down   through skin.  Dissection was carried down to the fracture site, which was   cleared of soft tissue and hematoma.  I reduced the fracture in near anatomic   alignment.  Given her poor bone quality, I placed 2 bicortical 3.5 mm cortical   screws to stabilize the medial malleolus fracture.  I then placed an anterior   to posterior 3.5 mm lag screw across the minimally displaced posterior   malleolus fracture after fine tuning the alignment with a dental pick   posteriorly behind the fibula.  Final fluoroscopic imaging confirmed overall   acceptable alignment of the fractures and acceptable position of the implants.    I did perform a stress examination of the ankle syndesmosis using external   rotation on fluoroscopic imaging to confirm stable ankle syndesmosis.  The   wounds were thoroughly irrigated with normal saline.  I repaired the   subcutaneous tissue layers with Vicryl suture and skin edges with staples.    Wounds were thoroughly cleansed, dried and sterile dressing and a well-padded   short leg plaster splint was applied.  She was then awoken from anesthesia and   transferred on the [facility] and taken to postanesthesia care unit in stable   condition.  The tourniquet had been deflated after 50 minutes.     PLAN:  1.  The patient will be discharged home when she recovers from anesthesia.  2.  She should be nonweightbearing on the right lower extremity in her  splint.  3.  She will need to follow up as scheduled 2 weeks postop for routine wound   check, staple removal and x-rays, 3 view of the right ankle.        ______________________________  MD FOUZIA Sahu/DENVER    DD:  08/30/2024 17:54  DT:  08/30/2024 18:09    Job#:  [id number]

## 2024-08-31 NOTE — THERAPY
Physical Therapy   Initial Evaluation and Discharge     Patient Name: Kanika Hinson  Age:  63 y.o., Sex:  female  Medical Record #: [id number]  Today's Date: 8/31/2024     Precautions  Precautions: Fall Risk;Non Weight Bearing Right Lower Extremity  Comments: splint RLE    Assessment  Patient is 63 y.o. female admitted for elective R ankle trimalleolar fx ORIF on 8/30, with post-op HTN. GLF about 1.5 weeks ago. PMHx of chronic pain, anxiety. Educated pt on NWB RLE, pt with good demo during transfer; did not require any hands on assistance. Reports normally getting around in w/c since fall. Pt demonstrates functional capability to return to w/c level mobility, spouse bumps pt up needed steps in her w/c. Recommend return home with no further needs, eventual OP PT once appropriate.     Patient will not be actively followed for physical therapy services at this time, however may be seen if requested by physician for 1 more visit within 30 days to address any discharge or equipment needs.    Plan    Physical Therapy Initial Treatment Plan   Duration: Discharge Needs Only    DC Equipment Recommendations: None (has w/c, FWW, crutches)  Discharge Recommendations:  (declined need for HH as S.O. able to assist with all mobility. Recommend OP PT once appropriate)       Subjective    Pt endorses getting around with NWB RLE for a while prior to surgery on 8/30.     Objective     08/31/24 0911   Vitals   O2 Delivery Device None - Room Air   Pain 0 - 10 Group   Therapist Pain Assessment Post Activity Pain Same as Prior to Activity;Nurse Notified  (denies pain RLE 2/2 numbness)   Prior Living Situation   Prior Services Intermittent Physical Support for ADL Per Family   Housing / Facility 3 Story House   Steps Into Home 2   Steps In Home   (ramp to bedroom, 2 steps to kitchen, 13 steps to top floor (does not need to go up))   Rail None   Bathroom Set up   (reports w/c fits in BR)   Equipment Owned Front-Wheel  "Walker;Crutches;Wheelchair  (getting scooter)   Lives with - Patient's Self Care Capacity Significant Other   Comments S.O. has been assisting pt with mobility and ADL's since ankle Fx prior to surgery.   Prior Level of Functional Mobility   Bed Mobility Independent   Transfer Status Independent   Ambulation Independent   Ambulation Distance   (community)   Assistive Devices Used None   Comments Since ankle fx, pt has been mainly w/c level, occassionally utilizing crutches but not often. Pt's S.O. w/c bumps pt up/down stairs as well. Pt able to manually propell w/c independently. Reports she has performed seated scoot up/down flight of stairs when needed, but will not need to in future   History of Falls   History of Falls Yes   Date of Last Fall   (related to admit, fell just prior to son's wedding in [place])   Cognition    Cognition / Consciousness WDL   Level of Consciousness Alert   Comments very pleasant and cooperative   Strength Upper Body   Upper Body Strength  WDL   Active ROM Lower Body    Comments difficulty moving RLE reporting feeling like a \"lead foot\", able to manage utilizing UE's and LLE. LLE WDL   Strength Lower Body   Comments LLE WDL, RLE grossly 2-/5, R hip flexion 3/5 during transfer able to maintain RLE NWB   Sensation Lower Body   Comments reports completely numb RLE, absent light touch   Coordination Lower Body    Comments impaired RLE   Balance Assessment   Sitting Balance (Static) Fair +   Sitting Balance (Dynamic) Fair +   Standing Balance (Static) Fair   Standing Balance (Dynamic) Fair   Weight Shift Sitting Fair   Weight Shift Standing Fair  (NWB RLE)   Bed Mobility    Supine to Sit Supervised   Sit to Supine Minimal Assist  (for RLE, reports S.O. can help)   Scooting Supervised   Rolling Supervised   Gait Analysis   Gait Level Of Assist   (defferred as pt mainly w/c transfer level recent baseline)   Functional Mobility   Sit to Stand Standby Assist   Bed, Chair, Wheelchair Transfer " Standby Assist   Transfer Method Squat Pivot   Mobility supine, EOB, transfer, EOB, return supine   6 Clicks Assessment - How much HELP from from another person do you currently need... (If the patient hasn't done an activity recently, how much help from another person do you think he/she would need if he/she tried?)   Turning from your back to your side while in a flat bed without using bedrails? 4   Moving from lying on your back to sitting on the side of a flat bed without using bedrails? 4   Moving to and from a bed to a chair (including a wheelchair)? 3   Standing up from a chair using your arms (e.g., wheelchair, or bedside chair)? 3   Walking in hospital room? 3   Climbing 3-5 steps with a railing? 3   6 clicks Mobility Score 20   Activity Tolerance   Comments functional for return home   Patient / Family Goals    Patient / Family Goal #1 to return home   Education Group   Education Provided Role of Physical Therapist;Weight Bearing Precautions;Transfer Status;Use of Assistive Device   Role of Physical Therapist Patient Response Patient;Acceptance;Explanation;Verbal Demonstration   Use of Assistive Device Patient Response Patient;Acceptance;Explanation;Demonstration;Verbal Demonstration  (reviewed crutch training if decides to use in future, as well as preference for FWW)   Transfer Status Patient Response Patient;Acceptance;Explanation;Action Demonstration   Weight Bearing Precautions Patient Response Patient;Acceptance;Explanation;Demonstration;Verbal Demonstration;Action Demonstration   Additional Comments Receptive to self management, compensatory and home management strategies while maintaining RLE NWB   Physical Therapy Initial Treatment Plan    Duration Discharge Needs Only   Problem List    Problems Impaired Bed Mobility;Impaired Transfers;Impaired Ambulation;Impaired Balance;Decreased Activity Tolerance  (expected post-op)   Anticipated Discharge Equipment and Recommendations   DC Equipment  Recommendations None  (has w/c, FWW, crutches)   Discharge Recommendations   (declined need for HH as S.O. able to assist with all mobility. Recommend OP PT once appropriate)   Interdisciplinary Plan of Care Collaboration   IDT Collaboration with  Nursing   Patient Position at End of Therapy In Bed;Call Light within Reach;Tray Table within Reach;Phone within Reach  (as found)   Collaboration Comments RN updated   Session Information   Date / Session Number  8/31: eval only, d/c needs

## 2024-08-31 NOTE — CARE PLAN
The patient is Watcher - Medium risk of patient condition declining or worsening    Shift Goals  Clinical Goals: stable BPs, pain control  Patient Goals: rest  Family Goals: BUNNY    Progress made toward(s) clinical / shift goals:    Problem: Pain - Standard  Goal: Alleviation of pain or a reduction in pain to the patient’s comfort goal  Outcome: Progressing     Problem: Knowledge Deficit - Standard  Goal: Patient and family/care givers will demonstrate understanding of plan of care, disease process/condition, diagnostic tests and medications  Outcome: Progressing     Problem: Hemodynamics  Goal: Patient's hemodynamics, fluid balance and neurologic status will be stable or improve  Outcome: Progressing     Problem: Fluid Volume  Goal: Fluid volume balance will be maintained  Outcome: Progressing     Problem: Fall Risk  Goal: Patient will remain free from falls  Outcome: Progressing       Patient is not progressing towards the following goals:

## 2024-08-31 NOTE — H&P
Hospital Medicine History & Physical Note    Date of Service  8/30/2024    Primary Care Physician  Pcp Pt States None    Consultants  Orthopedic (Dr. Hernandez)    Code Status  Full Code    Chief Complaint  No chief complaint on file.  Nausea vomiting  Right ankle pain    History of Presenting Illness  Kanika Hinson is a 63 y.o. female with history of chronic pain syndrome on opioids, anxiety, right ankle fracture who presented 8/30/2024 with elective operative repair of right ankle fracture by orthopedic surgery.  Postoperatively, patient reported to have severe nausea, vomiting, uncontrolled blood pressure with SBP of above 200 while in PACU.  Therefore, admission requested by orthopedic surgery and anesthesia service to medicine service.  Patient was seen by me in PACU, admitted to medicine service for further evaluation and treatment.    I discussed the plan of care with patient, bedside RN, and pharmacy.    Review of Systems  Review of Systems   Constitutional:  Negative for chills and fever.   HENT:  Negative for hearing loss and tinnitus.    Eyes:  Negative for blurred vision and double vision.   Respiratory:  Negative for cough and shortness of breath.    Cardiovascular:  Negative for chest pain and palpitations.   Gastrointestinal:  Positive for heartburn, nausea and vomiting. Negative for abdominal pain.   Genitourinary:  Negative for dysuria and urgency.   Musculoskeletal:  Positive for joint pain and myalgias. Negative for falls.   Skin:  Negative for itching and rash.   Neurological:  Positive for weakness and headaches. Negative for dizziness.   Endo/Heme/Allergies:  Negative for environmental allergies. Does not bruise/bleed easily.   Psychiatric/Behavioral:  Negative for depression and substance abuse.    All other systems reviewed and are negative.      Past Medical History   has a past medical history of Pain, PONV (postoperative nausea and vomiting), and Psychiatric problem.    Surgical  History   has a past surgical history that includes abdominal subtotal hysterectomy (12/06/1996); primary c section; repeat c section; repeat c section; cervical disk and fusion anterior; and other abdominal surgery.     Family History  family history is not on file.   Family history reviewed with patient. There is no family history that is pertinent to the chief complaint.     Social History   reports that she has quit smoking. Her smoking use included cigarettes. She does not have any smokeless tobacco history on file. She reports current alcohol use of about 2.4 oz of alcohol per week. She reports that she does not use drugs.    Allergies  Allergies   Allergen Reactions    Codeine Vomiting    Morphine Vomiting    Xyzal [Levocetirizine] Vomiting     Makes joints/muscles hurt. Causes nausea and vomiting       Medications  Prior to Admission Medications   Prescriptions Last Dose Informant Patient Reported? Taking?   Magnesium 250 MG Tab 8/29/2024 at PM Patient Yes Yes   Sig: Take 250 mg by mouth every day.   celecoxib (CELEBREX) 100 MG Cap 1 WEEK AGO at HOLD Patient Yes No   Sig: Take 1 Capsule by mouth 2 times a day.   diazePAM (VALIUM) 5 MG Tab 8/30/2024 at 0500 Patient Yes Yes   Sig: Take 1 Tablet by mouth 3 times a day.   diphenhydrAMINE HCl, Sleep, (ZZZQUIL PO) 8/29/2024 at PM Patient Yes Yes   Sig: Take 1 Dose by mouth at bedtime as needed (sleep).   estradiol (ESTRACE) 0.1 MG/GM vaginal cream 2 WEEKS AGO at PT HELD Patient Yes No   Sig: Insert 1 g into the vagina two times a week.   ibuprofen (MOTRIN) 200 MG Tab 2 DAYS AGO at PRN Patient Yes Yes   Sig: Take 800 mg by mouth every 8 hours as needed for Mild Pain.   oxyCODONE-acetaminophen (PERCOCET-10)  MG Tab 8/30/2024 at 0500 Patient Yes Yes   Sig: Take 1 Tablet by mouth 2 times a day as needed for Severe Pain.   tizanidine (ZANAFLEX) 4 MG Tab 8/30/2024 at 0500 Patient Yes Yes   Sig: Take 4 mg by mouth every 6 hours as needed (spasm).   traMADol ER  (ULTRAM-ER) 100 MG tablet 8/30/2024 at 0500 Patient Yes Yes   Sig: Take 1 Tablet by mouth every day.   zaleplon (SONATA) 10 MG capsule 8/28/2024 at PM Patient Yes No   Sig: Take 10 mg by mouth every evening.      Facility-Administered Medications: None       Physical Exam  Temp:  [36.4 °C (97.6 °F)-37.2 °C (99 °F)] 37 °C (98.6 °F)  Pulse:  [] 91  Resp:  [10-20] 18  BP: (119-207)/(63-96) 175/96  SpO2:  [92 %-97 %] 96 %  Blood Pressure: (!) 177/85   Temperature: 36.7 °C (98 °F)   Pulse: 73   Respiration: 20   Pulse Oximetry: 92 %       Physical Exam  Vitals and nursing note reviewed.   Constitutional:       General: She is not in acute distress.  HENT:      Head: Normocephalic and atraumatic.      Nose: Nose normal.      Mouth/Throat:      Mouth: Mucous membranes are moist.      Pharynx: Oropharynx is clear.   Eyes:      General: No scleral icterus.     Extraocular Movements: Extraocular movements intact.   Cardiovascular:      Rate and Rhythm: Normal rate and regular rhythm.      Pulses: Normal pulses.      Heart sounds:      No friction rub.   Pulmonary:      Effort: No respiratory distress.      Breath sounds: No wheezing or rales.   Chest:      Chest wall: No tenderness.   Abdominal:      General: There is no distension.      Tenderness: There is no abdominal tenderness. There is no guarding or rebound.   Musculoskeletal:         General: Tenderness present.      Cervical back: Neck supple. No tenderness.      Comments: RLE --immobilizer in place   Skin:     General: Skin is warm and dry.      Capillary Refill: Capillary refill takes less than 2 seconds.   Neurological:      General: No focal deficit present.      Mental Status: She is alert and oriented to person, place, and time.   Psychiatric:         Mood and Affect: Mood normal.         Laboratory:  Recent Labs     08/30/24  1436   WBC 7.5   RBC 3.73*   HEMOGLOBIN 13.0   HEMATOCRIT 38.9   .3*   MCH 34.9*   MCHC 33.4   RDW 51.2*   PLATELETCT 371  "  MPV 10.6     Recent Labs     08/30/24  1436   SODIUM 140   POTASSIUM 5.1   CHLORIDE 104   CO2 22   GLUCOSE 114*   BUN 12   CREATININE 0.97   CALCIUM 9.5     Recent Labs     08/30/24  1436   GLUCOSE 114*         No results for input(s): \"NTPROBNP\" in the last 72 hours.      No results for input(s): \"TROPONINT\" in the last 72 hours.    Imaging:  DX-PORTABLE FLUORO > 1 HOUR    (Results Pending)   DX-ANKLE 3+ VIEWS RIGHT    (Results Pending)   EC-ECHOCARDIOGRAM COMPLETE W/O CONT    (Results Pending)       X-Ray:  I have personally reviewed the images and compared with prior images.  EKG:  I have personally reviewed the images and compared with prior images.    Assessment/Plan:  Justification for Admission Status  I anticipate this patient will require at least 2 midnights of hospitalization, therefore appropriate for inpatient status.      * Hypertensive emergency- (present on admission)  Assessment & Plan  BP as high as 207/91 while in PACU  Currently does not take any hypertensive meds  Initiate on losartan --titrate up as tolerated  Control pain    As needed IV hydralazine, IV labetalol  Telemetry monitoring  Check echo    Closed right ankle fracture, initial encounter  Assessment & Plan  S/p OR by Dr. Hernandez 8/30/2024  PT/OT  Supportive pain control    Chronic narcotic dependence (HCC)  Assessment & Plan  Continue home oxycodone 10 mg every 6 hours  As needed oxycodone and Dilaudid for breakthrough pain    Anxiety  Assessment & Plan  Continue home Valium    Intractable nausea and vomiting  Assessment & Plan  Postoperatively  IVF  Advance diet as tolerated  Trial of IV Reglan and IV Protonix  Supportive additional antiemetic as needed        VTE prophylaxis: SCDs/TEDs  "

## 2024-08-31 NOTE — ANESTHESIA TIME REPORT
Anesthesia Start and Stop Event Times       Date Time Event    8/30/2024 1502 Ready for Procedure     1609 Anesthesia Start     1754 Anesthesia Stop          Responsible Staff  08/30/24      Name Role Begin End    Nicky Harmon M.D. Anesth [id number]          Overtime Reason:  no overtime (within assigned shift)    Comments:

## 2024-08-31 NOTE — ASSESSMENT & PLAN NOTE
Continue home oxycodone 10 mg every 6 hours  As needed oxycodone and Dilaudid for breakthrough pain

## 2024-08-31 NOTE — DISCHARGE SUMMARY
Discharge Summary    CHIEF COMPLAINT ON ADMISSION  No chief complaint on file.      Reason for Admission  Hypertensive emergency     Admission Date  8/30/2024    CODE STATUS  Prior    HPI & HOSPITAL COURSE  Kanika Hinson is a 63 y.o. female with history of chronic pain syndrome on opioids, anxiety, right ankle fracture who presented 8/30/2024 with elective operative repair of right ankle fracture by orthopedic surgery.  Postoperatively, patient reported to have severe nausea, vomiting, uncontrolled blood pressure with SBP of above 200 while in PACU.  Therefore, admission requested by orthopedic surgery and anesthesia service to medicine service. Patient was monitored on telemetry floor. Her blood pressure and nausea improved with medications. She is tolerated some diet without nausea. She is relatively pain free by surgical site given epidural block given during surgery. She is feeling well and discharged to home under care of . She is to follow up with her surgery team. She is given short course of home medication refills for pain and anxiety medications until she can follow up with her pain management specialist.    Therefore, she is discharged in fair and stable condition to home with close outpatient follow-up.        Discharge Date  8/31/2024    FOLLOW UP ITEMS POST DISCHARGE  Take medications as prescribed.  Follow up with surgery team and pain management.    DISCHARGE DIAGNOSES  Principal Problem:    Hypertensive emergency (POA: Yes)  Active Problems:    Intractable nausea and vomiting (POA: Unknown)    Anxiety (POA: Unknown)    Chronic pain (POA: Unknown)    Chronic narcotic dependence (HCC) (POA: Unknown)    Closed right ankle fracture, initial encounter (POA: Unknown)  Resolved Problems:    * No resolved hospital problems. *      FOLLOW UP  No future appointments.  Vasiliy Hernandez M.D.  [address]  [address]  [facility] [id number] [phone]    Call  As needed, If symptoms  worsen, For wound re-check, call for  follow - up appointment post surgery.      MEDICATIONS ON DISCHARGE     Medication List        START taking these medications        Instructions   ondansetron 4 MG Tbdp  Commonly known as: Zofran ODT   Take 1 Tablet by mouth every 6 hours as needed for Nausea/Vomiting.  Dose: 4 mg            CHANGE how you take these medications        Instructions   oxyCODONE-acetaminophen  MG Tabs  What changed: when to take this  Commonly known as: Percocet-10   Take 1 Tablet by mouth every 6 hours as needed for Severe Pain for up to 5 days.  Dose: 1 Tablet            CONTINUE taking these medications        Instructions   celecoxib 100 MG Caps  Commonly known as: CeleBREX   Take 1 Capsule by mouth 2 times a day.  Dose: 1 Capsule     diazePAM 5 MG Tabs  Commonly known as: Valium   Take 1 Tablet by mouth 3 times a day for 5 days.  Dose: 5 mg     estradiol 0.1 MG/GM vaginal cream  Commonly known as: Estrace   Insert 1 g into the vagina two times a week.  Dose: 1 g     ibuprofen 200 MG Tabs  Commonly known as: Motrin   Take 800 mg by mouth every 8 hours as needed for Mild Pain.  Dose: 800 mg     Magnesium 250 MG Tabs   Take 250 mg by mouth every day.  Dose: 250 mg     tizanidine 4 MG Tabs  Commonly known as: Zanaflex   Take 1 Tablet by mouth every 6 hours as needed (spasm).  Dose: 4 mg     traMADol  MG tablet  Commonly known as: Ultram-ER   Take 1 Tablet by mouth every day.  Dose: 1 Tablet     zaleplon 10 MG capsule  Commonly known as: Sonata   Take 10 mg by mouth every evening.  Dose: 10 mg     ZZZQUIL PO   Take 1 Dose by mouth at bedtime as needed (sleep).  Dose: 1 Dose              Allergies  Allergies   Allergen Reactions    Codeine Vomiting    Morphine Vomiting    Xyzal [Levocetirizine] Vomiting     Makes joints/muscles hurt. Causes nausea and vomiting       DIET  No orders of the defined types were placed in this encounter.      ACTIVITY  As tolerated.  Non weight bearing  operative leg    CONSULTATIONS  Orthopedic surgery    PROCEDURES  DATE OF SERVICE:  08/30/2024      PREOPERATIVE DIAGNOSIS:  Right trimalleolar ankle fracture.     POSTOPERATIVE DIAGNOSIS:  Right trimalleolar ankle fracture.     PROCEDURE PERFORMED:  Open treatment with internal fixation of right   trimalleolar ankle fracture with fixation of posterior lip.     SURGEON:  Vasiliy Hernandez MD     ANESTHESIOLOGIST:  Nicky Harmon MD     ANESTHESIA:  General and regional.     ESTIMATED BLOOD LOSS:  Minimal.     TOURNIQUET TIME:  50 minutes at 250 mmHg to the right thigh.     IMPLANTS:  Timberon VariAx 1/3 tubular locking plate and 3.5 mm cortical   screws.     INDICATIONS FOR PROCEDURE:  The patient is 63 years old.  She is about a week   and a half out from a right trimalleolar ankle fracture sustained in [place].    She came back home.  I saw her this week in clinic and we discussed treatment   options.  I recommended surgical reduction and fixation for unstable   fracture.  She signed informed consent preoperatively and wished to proceed   with surgery as outlined above.     DESCRIPTION OF PROCEDURE:  The patient was met in the preoperative holding   area and her surgical site was signed.  Her consent was confirmed to be   accurate.  Dr. Harmon performed a regional anesthetic at my request to   help with postoperative pain control.  General anesthesia was induced after   she was taken back to the operating room.  Right thigh had a tourniquet   applied and right lower extremity was provisionally cleansed with isopropyl   alcohol and then prepped and draped in the usual sterile fashion.  A formal   timeout was performed to confirm patient's correct name, correct surgical   site, correct procedure and correct laterality.  The limb was exsanguinated   with an Esmarch and tourniquet was inflated to 250 mmHg.  A lateral incision   over the fibula fracture was performed with a scalpel down through skin.     Dissection was carried down to the fracture site.  There was a comminution   present but I was able to position a laterally based 1/3 tubular locking   plate, bridging the main fracture fragments.  There was some far posterior   comminution present extending proximally but I was able to fix the plate to   bone distally with several unicortical locking screws bringing the fibula out   to length as much as possible and fixed it proximally with several locking   screws as well.  I then turned my attention to the medial malleolus and a   longitudinal incision was made over the medial malleolus with a scalpel down   through skin.  Dissection was carried down to the fracture site, which was   cleared of soft tissue and hematoma.  I reduced the fracture in near anatomic   alignment.  Given her poor bone quality, I placed 2 bicortical 3.5 mm cortical   screws to stabilize the medial malleolus fracture.  I then placed an anterior   to posterior 3.5 mm lag screw across the minimally displaced posterior   malleolus fracture after fine tuning the alignment with a dental pick   posteriorly behind the fibula.  Final fluoroscopic imaging confirmed overall   acceptable alignment of the fractures and acceptable position of the implants.    I did perform a stress examination of the ankle syndesmosis using external   rotation on fluoroscopic imaging to confirm stable ankle syndesmosis.  The   wounds were thoroughly irrigated with normal saline.  I repaired the   subcutaneous tissue layers with Vicryl suture and skin edges with staples.    Wounds were thoroughly cleansed, dried and sterile dressing and a well-padded   short leg plaster splint was applied.  She was then awoken from anesthesia and   transferred on the [facility] and taken to postanesthesia care unit in stable   condition.  The tourniquet had been deflated after 50 minutes.     PLAN:  1.  The patient will be discharged home when she recovers from anesthesia.  2.  She should  be nonweightbearing on the right lower extremity in her splint.  3.  She will need to follow up as scheduled 2 weeks postop for routine wound   check, staple removal and x-rays, 3 view of the right ankle.    LABORATORY  Lab Results   Component Value Date    SODIUM 140 08/30/2024    POTASSIUM 5.1 08/30/2024    CHLORIDE 104 08/30/2024    CO2 22 08/30/2024    GLUCOSE 114 (H) 08/30/2024    BUN 12 08/30/2024    CREATININE 0.97 08/30/2024    CREATININE 0.9 04/23/2007        Lab Results   Component Value Date    WBC 7.5 08/30/2024    HEMOGLOBIN 13.0 08/30/2024    HEMATOCRIT 38.9 08/30/2024    PLATELETCT 371 08/30/2024        Total time of the discharge process exceeds 33 minutes.

## 2024-08-31 NOTE — DISCHARGE INSTRUCTIONS
HOME CARE INSTRUCTIONS    ACTIVITY: Rest and take it easy for the first 24 hours.  A responsible adult is recommended to remain with you during that time.  It is normal to feel sleepy.  We encourage you to not do anything that requires balance, judgment or coordination.    FOR 24 HOURS DO NOT:  Drive, operate machinery or run household appliances.  Drink beer or alcoholic beverages.  Make important decisions or sign legal documents.    SPECIAL INSTRUCTIONS:   -Non Weight Bearing  Right Light Extremity  in splint.   --Has percocet prescribed already by pain management, okay to take celebrex as well  --follow- up  2 weeks postop as scheduled      Displaced Medial or Posterior Malleolar Ankle Fracture Treated With ORIF, Care After  This sheet gives you information about how to care for yourself after your procedure. Your health care provider may also give you more specific instructions. If you have problems or questions, contact your health care provider.  What can I expect after the procedure?  After the procedure, it is common to have:  Pain.  Swelling.  A small amount of fluid from your incision.  Follow these instructions at home:  If you have a boot:  Wear the boot as told by your health care provider. Remove it only as told by your health care provider.  Loosen the boot if your toes tingle, become numb, or turn cold and blue.  Keep the boot clean and dry.  If you have a cast:  Do not stick anything inside the cast to scratch your skin. Doing that increases your risk of infection.  Check the skin around the cast every day. Tell your health care provider about any concerns.  You may put lotion on dry skin around the edges of the cast. Do not put lotion on the skin underneath the cast.  Keep the cast clean and dry.  Bathing  Do not take baths, swim, or use a hot tub until your health care provider approves. Ask your health care provider if you can take showers.  If your boot or cast is not waterproof:  Do not let it  get wet.  Cover it with a watertight covering when you take a bath or a shower.  Keep the bandage (dressing) dry until your health care provider says it can be removed.  Incision care    Follow instructions from your health care provider about how to take care of your incision. Make sure you:  Wash your hands with soap and water for at least 20 seconds before and after you change your dressing. If soap and water are not available, use hand .  Change your dressing as told by your health care provider.  Leave stitches (sutures), skin glue, or adhesive strips in place. These skin closures may need to stay in place for 2 weeks or longer. If adhesive strip edges start to loosen and curl up, you may trim the loose edges. Do not remove adhesive strips completely unless your health care provider tells you to do that.  Check your incision area every day for signs of infection. Check for:  Redness.  More pain or swelling.  Blood or more fluid.  Warmth.  Pus or a bad smell.  Managing pain, stiffness, and swelling  If directed, put ice on the affected area. To do this:  If you have a removable boot, remove it as told by your health care provider.  Put ice in a plastic bag.  Place a towel between your skin and the bag or between your cast and the bag.  Leave the ice on for 20 minutes, 2-3 times a day.  Remove the ice if your skin turns bright red. This is very important. If you cannot feel pain, heat, or cold, you have a greater risk of damage to the area.  Move your toes often to reduce stiffness and swelling.  Raise (elevate) the injured area above the level of your heart while you are sitting or lying down. To do this, try putting a few pillows under your leg and ankle.  Activity  Do exercises as told by your health care provider or physical therapist.  Do not use your injured limb to support (bear) your body weight until your health care provider says that you can. Follow weight-bearing restrictions as told. Use  crutches or other devices to help you move around (assistive devices) as told by your health care provider.  Ask your health care provider when it is safe to drive if you have a boot or cast on your foot.  Return to your normal activities as told by your health care provider. Ask your health care provider what activities are safe for you.  General instructions  Do not put pressure on any part of the cast until it is fully hardened. This may take several hours.  Do not use any products that contain nicotine or tobacco, such as cigarettes, e-cigarettes, and chewing tobacco. These can delay bone healing after surgery. If you need help quitting, ask your health care provider.  Take over-the-counter and prescription medicines only as told by your health care provider.  Ask your health care provider if the medicine prescribed to you:  Requires you to avoid driving or using machinery.  Can cause constipation. You may need to take these actions to prevent or treat constipation:  Drink enough fluid to keep your urine pale yellow.  Take over-the-counter or prescription medicines.  Eat foods that are high in fiber, such as beans, whole grains, and fresh fruits and vegetables.  Limit foods that are high in fat and processed sugars, such as fried or sweet foods.  Keep all follow-up visits. This is important.  Contact a health care provider if:  You have a fever.  Your pain medicine is not helping.  You have redness around your incision.  You have more swelling or pain around your incision.  You have blood or more fluid coming from your incision or leaking through your cast.  Your incision feels warm to the touch.  You have pus or a bad smell coming from your incision or dressing.  Get help right away if:  The edges of your incision come apart after the sutures or staples have been taken out.  You have chest pain.  You have trouble breathing.  You have numbness or tingling in your foot or leg.  Your foot becomes cold, pale, or  blue.  You have pain or swelling in your calf.  These symptoms may represent a serious problem that is an emergency. Do not wait to see if the symptoms will go away. Get medical help right away. Call your local emergency services (911 in the [place] Do not drive yourself to the hospital.  Summary  After the procedure, it is common to have some pain and swelling.  If your boot or cast is not waterproof, do not let it get wet.  Contact your health care provider if you have more pain or swelling, or if you have blood or more fluid coming from your incision or leaking through your cast.  Get help right away if you have numbness or tingling in your foot or leg, or if your foot becomes cold, pale, or blue.  This information is not intended to replace advice given to you by your health care provider. Make sure you discuss any questions you have with your health care provider.      SURGICAL DRESSING/BATHING: keep dressing clean dry and intact . May shower after 2 days , no submersion.     MEDICATIONS: Resume taking daily medication.  Take prescribed pain medication with food.  If no medication is prescribed, you may take non-aspirin pain medication if needed.  PAIN MEDICATION CAN BE VERY CONSTIPATING.  Take a stool softener or laxative such as senokot, pericolace, or milk of magnesia if needed.     No new Prescription given .    Last pain medication given oxycodone at 3:16PM `.    A follow-up appointment should be arranged with your doctor in    [phone]   ; call to schedule.    You should CALL YOUR PHYSICIAN if you develop:  Fever greater than 101 degrees F.  Pain not relieved by medication, or persistent nausea or vomiting.  Excessive bleeding (blood soaking through dressing) or unexpected drainage from the wound.  Extreme redness or swelling around the incision site, drainage of pus or foul smelling drainage.  Inability to urinate or empty your bladder within 8 hours.  Problems with breathing or chest pain.    You  should call 911 if you develop problems with breathing or chest pain.  If you are unable to contact your doctor or surgical center, you should go to the nearest emergency room or urgent care center.  Physician's telephone #:    [phone]       MILD FLU-LIKE SYMPTOMS ARE NORMAL.  YOU MAY EXPERIENCE GENERALIZED MUSCLE ACHES, THROAT IRRITATION, HEADACHE AND/OR SOME NAUSEA.    If any questions arise, call your doctor.  If your doctor is not available, please feel free to call the Surgical Center at [phone].  The Center is open Monday through Friday from 7AM to 7PM.      A registered nurse may call you a few days after your surgery to see how you are doing after your procedure.    You may also receive a survey in the mail within the next two weeks and we ask that you take a few moments to complete the survey and return it to us.  Our goal is to provide you with very good care and we value your comments.     Depression / Suicide Risk    As you are discharged from this [facility], it is important to learn how to keep safe from harming yourself.    Recognize the warning signs:  Abrupt changes in personality, positive or negative- including increase in energy   Giving away possessions  Change in eating patterns- significant weight changes-  positive or negative  Change in sleeping patterns- unable to sleep or sleeping all the time   Unwillingness or inability to communicate  Depression  Unusual sadness, discouragement and loneliness  Talk of wanting to die  Neglect of personal appearance   Rebelliousness- reckless behavior  Withdrawal from people/activities they love  Confusion- inability to concentrate     If you or a loved one observes any of these behaviors or has concerns about self-harm, here's what you can do:  Talk about it- your feelings and reasons for harming yourself  Remove any means that you might use to hurt yourself (examples: pills, rope, extension cords, firearm)  Get professional help  from the community (Mental Health, Substance Abuse, psychological counseling)  Do not be alone:Call your Safe Contact- someone whom you trust who will be there for you.  Call your local CRISIS HOTLINE 648-0678 or 140-505-7015  Call your local Children's Mobile Crisis Response Team [place] (630) 335-9801 or www.AppSurfer  Call the toll free National Suicide Prevention Hotlines   National Suicide Prevention Lifeline 636-783-IZJQ (2340)  [facility] Line Network 800-SUICIDE (357-3978)    I acknowledge receipt and understanding of these Home Care instructions.

## 2024-08-31 NOTE — ASSESSMENT & PLAN NOTE
Postoperatively  IVF  Advance diet as tolerated  Trial of IV Reglan and IV Protonix  Supportive additional antiemetic as needed

## 2024-08-31 NOTE — PROGRESS NOTES
4 Eyes Skin Assessment Completed by ROSINA Cabrera and ROSINA Reynolds.    Head WDL  Ears Redness and Blanching  Nose WDL  Mouth WDL  Neck WDL  Breast/Chest Redness and Blanching  Shoulder Blades WDL  Spine Redness and Blanching  (R) Arm/Elbow/Hand Redness and Blanching  (L) Arm/Elbow/Hand Redness and Blanching  Abdomen WDL  Groin WDL  Scrotum/Coccyx/Buttocks WDL  (R) Leg BUNNY due to surgery splint placement.  (L) Leg WDL  (R) Heel/Foot/Toe BUNNY  (L) Heel/Foot/Toe Redness and Blanching          Devices In Places Tele Box, Blood Pressure Cuff, Pulse Ox, and SCD's      Interventions In Place Gray Ear Foams and Pillows    Possible Skin Injury No    Pictures Uploaded Into Epic N/A  Wound Consult Placed N/A  RN Wound Prevention Protocol Ordered No

## 2024-08-31 NOTE — CARE PLAN
The patient is Watcher - Medium risk of patient condition declining or worsening    Shift Goals  Clinical Goals: pain control, BP control  Patient Goals: rest  Family Goals: sandra    Progress made toward(s) clinical / shift goals:    Problem: Knowledge Deficit - Standard  Goal: Patient and family/care givers will demonstrate understanding of plan of care, disease process/condition, diagnostic tests and medications  Outcome: Progressing  Note: Pt updated on plan of care, all questions answered at this time.      Problem: Hemodynamics  Goal: Patient's hemodynamics, fluid balance and neurologic status will be stable or improve  Outcome: Progressing  Note: Continuous pulse ox in place, BP checked regularly, monitor I/O's, cap refill less than 3 sec. On all extremities.     Problem: Fall Risk  Goal: Patient will remain free from falls  Outcome: Progressing  Note: Bed locked and in lowest position, bed alarm on, pt calls appropriately.

## 2024-08-31 NOTE — OR NURSING
[id number] patient transported to room with transport tech and ACLS RN on monitor. Belongings including phone and crutches with patient.

## 2024-08-31 NOTE — ASSESSMENT & PLAN NOTE
BP as high as 207/91 while in PACU  Currently does not take any hypertensive meds  Initiate on losartan --titrate up as tolerated  Control pain    As needed IV hydralazine, IV labetalol  Telemetry monitoring  Check echo

## 2024-08-31 NOTE — ANESTHESIA PROCEDURE NOTES
Peripheral Block    Date/Time: 8/30/2024 4:21 PM    Performed by: Nicky Harmon M.D.  Authorized by: Nicky Harmon M.D.    Patient Location:  OR  Start Time:  8/30/2024 4:21 PM  End Time:  8/30/2024 4:23 PM  Reason for Block: at surgeon's request and post-op pain management ONLY    patient identified, IV checked, site marked, risks and benefits discussed, surgical consent, monitors and equipment checked, pre-op evaluation and timeout performed    Patient Position:  Supine  Prep: ChloraPrep    Monitoring:  Heart rate, continuous pulse ox and cardiac monitor  Block Region:  Lower Extremity  Lower Extremity - Block Type:  FEMORAL nerve block, Infra-Inguinal approach    Laterality:  Right  Procedures: ultrasound guided  Image captured, interpreted and electronically stored.  Local Infiltration:  Lidocaine  Dose:  3 ml  Block Type:  Single-shot  Needle Length:  100mm  Needle Gauge:  21 G  Needle Localization:  Ultrasound guidance  Ultrasound picture in chart  Injection Assessment:  Negative aspiration for heme, no paresthesia on injection, incremental injection and local visualized surrounding nerve on ultrasound  Evidence of intravascular injection: No     US Guided Femoral Nerve Block:   US probe placed at inguinal crease and the Femoral Nerve (FN) identified lateral to the Femoral Artery (FA).  Needle inserted lateral to probe in an in plane approach and advanced under direct visualization through the fascia juna and fascia iliaca remaining lateral to the FN.  After negative aspiration LA injected with ease and visualized surrounding the FN.

## 2024-08-31 NOTE — ANESTHESIA PROCEDURE NOTES
Peripheral Block    Date/Time: 8/30/2024 4:17 PM    Performed by: Nicky Harmon M.D.  Authorized by: Nicky Harmon M.D.    Patient Location:  OR  Start Time:  8/30/2024 4:17 PM  End Time:  8/30/2024 4:20 PM  Reason for Block: at surgeon's request and post-op pain management ONLY    patient identified, IV checked, site marked, risks and benefits discussed, surgical consent, monitors and equipment checked, pre-op evaluation and timeout performed    Patient Position:  Supine  Prep: ChloraPrep    Monitoring:  Heart rate, continuous pulse ox and cardiac monitor  Block Region:  Lower Extremity  Lower Extremity - Block Type:  SCIATIC nerve block, lateral approach    Laterality:  Right  Procedures: ultrasound guided  Image captured, interpreted and electronically stored.  Block Type:  Single-shot  Needle Length:  100mm  Needle Gauge:  21 G  Needle Localization:  Ultrasound guidance  Ultrasound picture in chart  Injection Assessment:  Negative aspiration for heme, no paresthesia on injection, incremental injection and local visualized surrounding nerve on ultrasound  Evidence of intravascular injection: No     US Guided Sciatic Nerve Block   US probe placed several cm proximal to popliteal crease on posterior thigh and scanned caudad and cephalad until Sciatic Nerve (SN) identified superficial/lateral to popliteal artery.  Needle inserted lateral to probe in an in plane approach under direct visualization to a perineural position.  After negative aspiration LA injected with ease and visualized surrounding the SN.

## 2024-08-31 NOTE — ANESTHESIA POSTPROCEDURE EVALUATION
Patient: Kanika Hinson    Procedure Summary       Date: 08/30/24 Room / Location: [facility] 08 / SURGERY [facility]    Anesthesia Start: 1609 Anesthesia Stop: 1754    Procedure: OPEN REDUCTION AND INTERNAL FIXATION OF RIGHT TRIMALLEOLAR ANKLE FRACTURE (Right: Leg Lower) Diagnosis: (CLOSED TRIMALLEOLAR FRACTURE OF RIGHT ANKLE)    Surgeons: Vasiliy Hernandez M.D. Responsible Provider: Nicky Harmon M.D.    Anesthesia Type: general, peripheral nerve block ASA Status: 3            Final Anesthesia Type: general, peripheral nerve block  Last vitals  BP   Blood Pressure: (!) 148/70    Temp   36.5 °C (97.7 °F)    Pulse   64   Resp   15    SpO2   96 %      Anesthesia Post Evaluation    Patient location during evaluation: PACU  Patient participation: complete - patient participated  Level of consciousness: awake and alert  Pain score: 6    Airway patency: patent  Anesthetic complications: no  Cardiovascular status: hemodynamically stable  Respiratory status: acceptable  Hydration status: euvolemic    PONV: none          No notable events documented.

## 2024-08-31 NOTE — PROGRESS NOTES
Pt discharged. IV removed, discharge instructions provided to patient, pt verbalizes understanding. Pt states all questions have been answered. Copy of discharge paperwork provided to pt, signed copy in chart. Pt states all belongings in possession. Pt left unit via WC.

## 2024-08-31 NOTE — ANESTHESIA PROCEDURE NOTES
Airway    Date/Time: 8/30/2024 4:25 PM    Performed by: Nicky Harmon M.D.  Authorized by: Nicky Harmon M.D.    Location:  OR  Urgency:  Elective  Indications for Airway Management:  Anesthesia      Spontaneous Ventilation: absent    Sedation Level:  Deep  Preoxygenated: Yes    Mask Difficulty Assessment:  0 - not attempted  Final Airway Type:  Supraglottic airway  Final Supraglottic Airway:  Standard LMA    SGA Size:  4  Airway Seal Pressure (cm H2O):  22  Number of Attempts at Approach:  1

## 2024-08-31 NOTE — PROGRESS NOTES
Pt transferred to unit by transport and ROSINA Maldonado. All belongings with pt. Monitors called. Site assessed cap refill less than 3 seconds on right big toe, right popliteal pulse +1. Pt reports no feeling with RN palpating but does report pressure in splint. Nerve block used during procedure. Orders released in recovery.

## (undated) DEVICE — TUBING CLEARLINK DUO-VENT - C-FLO (48EA/CA)

## (undated) DEVICE — LACTATED RINGERS INJ 1000 ML - (14EA/CA 60CA/PF)

## (undated) DEVICE — GLOVE BIOGEL PI ORTHO SZ 7.5 PF LF (40PR/BX)

## (undated) DEVICE — BOVIE BLADE COATED - (50/PK)

## (undated) DEVICE — ELECTRODE DUAL RETURN W/ CORD - (50/PK)

## (undated) DEVICE — SUTURE 2-0 VICRYL PLUS CT-1 36 (36PK/BX)"

## (undated) DEVICE — GLOVE BIOGEL PI INDICATOR SZ 7.5 SURGICAL PF LF -(50/BX 4BX/CA)

## (undated) DEVICE — WATER IRRIGATION STERILE 1000ML (12EA/CA)

## (undated) DEVICE — PACK LOWER EXTREMITY - (2/CA)

## (undated) DEVICE — DRAPE C ARMOR (12EA/CA)

## (undated) DEVICE — COTTON ROLL 1 POUND BIOSEAL - (5RL/CA)

## (undated) DEVICE — SUTURE 0 VICRYL PLUS CT-1 - 36 INCH (36/BX)

## (undated) DEVICE — SET EXTENSION WITH 2 PORTS (48EA/CA) ***PART #2C8610 IS A SUBSTITUTE*****

## (undated) DEVICE — COVER LIGHT HANDLE ALC PLUS DISP (18EA/BX)

## (undated) DEVICE — GOWN WARMING STANDARD FLEX - (30/CA)

## (undated) DEVICE — SENSOR OXIMETER ADULT SPO2 RD SET (20EA/BX)

## (undated) DEVICE — GLOVE SZ 7.5 BIOGEL PI MICRO - PF LF (50PR/BX)

## (undated) DEVICE — SET LEADWIRE 5 LEAD BEDSIDE DISPOSABLE ECG (1SET OF 5/EA)

## (undated) DEVICE — TOWELS CLOTH SURGICAL - (4/PK 20PK/CA)

## (undated) DEVICE — DRAPE SURG STERI-DRAPE 7X11OD - (40EA/CA)

## (undated) DEVICE — CANISTER SUCTION 3000ML MECHANICAL FILTER AUTO SHUTOFF MEDI-VAC NONSTERILE LF DISP (40EA/CA)

## (undated) DEVICE — BANDAGE ELASTIC STERILE MATRIX 6 X 10 (20EA/CA)

## (undated) DEVICE — DRAPE LARGE 3 QUARTER - (20/CA)

## (undated) DEVICE — GLOVE BIOGEL INDICATOR SZ 7.5 SURGICAL PF LTX - (50PR/BX 4BX/CA)

## (undated) DEVICE — SUTURE GENERAL

## (undated) DEVICE — SYRINGE 30 ML LL (56/BX)

## (undated) DEVICE — SUTURE 3-0 MONOCRYL PLUS PS-1 - 27 INCH (36/BX)

## (undated) DEVICE — DRAPE U ORTHOPEDIC - (10/BX)

## (undated) DEVICE — DRAPE 36X28IN RAD CARM BND BG - (25/CA) O

## (undated) DEVICE — PADDING CAST 6 IN STERILE - 6 X 4 YDS (24/CA)

## (undated) DEVICE — BIT DRILL L122MM OD3.5MM NONSTERILE OVER ADD ON FITTING

## (undated) DEVICE — BIT DRILL DIA2.6MM SCALED FOR VARIAX 2 WRIST FUSION LOCKING PLATE SYSTEM

## (undated) DEVICE — SLEEVE, VASO, THIGH, MED

## (undated) DEVICE — SODIUM CHL IRRIGATION 0.9% 1000ML (12EA/CA)

## (undated) DEVICE — SUCTION INSTRUMENT YANKAUER BULBOUS TIP W/O VENT (50EA/CA)

## (undated) DEVICE — SPLINT PLASTER 5 IN X 30 IN - (50EA/BX 6BX/CA)

## (undated) DEVICE — PAD LAP STERILE 18 X 18 - (5/PK 40PK/CA)